# Patient Record
Sex: MALE
[De-identification: names, ages, dates, MRNs, and addresses within clinical notes are randomized per-mention and may not be internally consistent; named-entity substitution may affect disease eponyms.]

---

## 2018-04-07 ENCOUNTER — HOSPITAL ENCOUNTER (EMERGENCY)
Dept: HOSPITAL 42 - ED | Age: 83
LOS: 1 days | Discharge: HOME | End: 2018-04-08
Payer: MEDICARE

## 2018-04-07 VITALS — BODY MASS INDEX: 25.7 KG/M2

## 2018-04-07 VITALS — HEART RATE: 78 BPM | RESPIRATION RATE: 19 BRPM

## 2018-04-07 VITALS — OXYGEN SATURATION: 100 % | TEMPERATURE: 97.6 F

## 2018-04-07 VITALS — DIASTOLIC BLOOD PRESSURE: 97 MMHG | SYSTOLIC BLOOD PRESSURE: 149 MMHG

## 2018-04-07 DIAGNOSIS — L02.32: Primary | ICD-10-CM

## 2018-04-07 NOTE — ED PDOC
Arrival/HPI





- General


Historian: Patient





<Edmundo Mc - Last Filed: 04/07/18 22:58>





<Josué Balderas - Last Filed: 04/07/18 23:24>





- General


Chief Complaint: Abnormal Skin Integrity


Time Seen by Provider: 04/07/18 22:49





- History of Present Illness


Narrative History of Present Illness (Text): 





04/07/18 23:02


84 y/o male, pmh including htn/hyperlipidemia, nkda, c/o 2 pimple near the rt. 

buttock region x 3 days.  Pt. stated he has 2 small pimple on the rt. buttock 

gluteal region, tried to squeezed it and no oozing/discharge, no fever or chills

, no numbness or tingling, eating and drinking well, no change in energy or 

appetize, no other medical or psychological complaints.  (Edmundo Mc)





Past Medical History





- Provider Review


Nursing Documentation Reviewed: Yes





- Infectious Disease


Hx of Infectious Diseases: None





- Tetanus Immunization


Tetanus Immunization: Unknown





- Cardiac


Hx Hypertension: Yes


Hx Pacemaker: No





- Neurological


Hx Paralysis: No





- Hematological/Oncological


Hx Blood Transfusions: No





- Musculoskeletal/Rheumatological


Hx Musculoskeletal Disorders: Yes (NEUROPATHY)





- Psychiatric


Hx Emotional Abuse: No


Hx Physical Abuse: No


Hx Substance Use: No





- Surgical History


Hx Cardiac Catheterization: Yes





- Anesthesia


Hx Anesthesia: Yes


Hx Anesthesia Reactions: No


Hx Malignant Hyperthermia: No





- Suicidal Assessment


Feels Threatened In Home Enviroment: No





<Edmundo Mc - Last Filed: 04/07/18 22:58>





Family/Social History





- Physician Review


Nursing Documentation Reviewed: Yes


Family/Social History: Unknown Family HX


Smoking Status: Never Smoked


Hx Alcohol Use: Yes (WINE)


Frequency of alcohol use: Socially


Hx Substance Use: No


Hx Substance Use Treatment: No





<Edmundo Mc - Last Filed: 04/07/18 22:58>





Allergies/Home Meds





<Edmundo Mc - Last Filed: 04/07/18 22:58>





<Josué Balderas - Last Filed: 04/07/18 23:24>


Allergies/Adverse Reactions: 


Allergies





No Known Allergies Allergy (Verified 04/07/18 22:55)


 








Home Medications: 


 Home Meds











 Medication  Instructions  Recorded  Confirmed


 


Gabapentin [Gabapentin] 300 mg PO BID 01/16/15 08/04/15


 


Metoprolol Succinate [Toprol XL] 25 mg PO TID 01/16/15 08/04/15


 


Simvastatin [Zocor] 20 mg PO DAILY 01/16/15 08/04/15


 


Ascorbic Acid [Vitamin C] 500 mg PO DAILY 02/27/15 08/04/15


 


Calcium Carbonate [Calcium] 600 mg PO DAILY 02/27/15 08/04/15


 


Cholecalciferol (Vitamin D3) 1,000 iu PO DAILY 02/27/15 08/04/15





[Vitamin D]   


 


Fish Oil 1 iu PO DAILY 02/27/15 08/04/15


 


Ibuprofen 200 mg PO BID PRN 02/27/15 07/24/15


 


Magnesium 200 mg PO DAILY 02/27/15 08/04/15


 


Vitamin E 100 iu PO DAILY 02/27/15 08/04/15


 


Zinc 50 mg PO DAILY 02/27/15 08/04/15


 


Acetaminophen/Codeine Phosph 1 tab PO Q4H PRN 08/04/15 04/07/18





[Acetaminophen and Codeine   





Phosphate #3 300 mg]   














Review of Systems





- Review of Systems


Constitutional: absent: Fatigue, Fevers


Eyes: absent: Vision Changes


ENT: absent: Hearing Changes


Respiratory: absent: SOB, Cough


Cardiovascular: absent: Chest Pain


Gastrointestinal: absent: Abdominal Pain, Nausea, Vomiting


Skin: Rash, Skin Lesions.  absent: Pruritis, Laceration, Abscess, Ulcer


Neurological: absent: Headache, Dizziness


Psychiatric: absent: Anxiety, Depression





<Edmundo Mc Q - Last Filed: 04/07/18 22:58>





Physical Exam


Vital Signs Reviewed: Yes


Temperature: Afebrile


Blood Pressure: Hypertensive


Pulse: Regular


Respiratory Rate: Normal


Appearance: Positive for: Well-Appearing, Non-Toxic, Comfortable


Pain Distress: Moderate


Mental Status: Positive for: Alert and Oriented X 3





- Systems Exam


Head: Present: Atraumatic, Normocephalic


Pupils: Present: PERRL


Extroacular Muscles: Present: EOMI


Conjunctiva: Present: Normal


Mouth: Present: Moist Mucous Membranes


Neck: Present: Normal Range of Motion


Respiratory/Chest: Present: Clear to Auscultation, Good Air Exchange.  No: 

Respiratory Distress, Accessory Muscle Use


Cardiovascular: Present: Regular Rate and Rhythm, Normal S1, S2.  No: Murmurs


Abdomen: No: Tenderness, Distention, Peritoneal Signs


Back: Present: Normal Inspection


Upper Extremity: Present: Normal Inspection.  No: Cyanosis, Edema


Lower Extremity: Present: Normal Inspection.  No: Edema


Neurological: Present: GCS=15, Speech Normal, Motor Func Grossly Intact, Gait 

Normal, Memory Normal


Skin: Present: Warm, Dry, Rashes (rt. gluteal muscle region visible 2 furuncles 

approx. 1cm with surrounding erythematous approx. 2cm diameter noted, no 

abscess fluctuant appreciated. ), Normal Color


Psychiatric: Present: Alert, Oriented x 3, Normal Insight, Normal Concentration





<Edmundo Mc - Last Filed: 04/07/18 22:58>





Vital Signs











  Temp Pulse Resp BP Pulse Ox


 


 04/07/18 23:06   87   191/116 H 


 


 04/07/18 22:54  97.6 F  72  20  192/116 H  100














Medical Decision Making





<Edmundo Mc - Last Filed: 04/07/18 22:58>





<Josué Balderas - Last Filed: 04/07/18 23:24>


ED Course and Treatment: 





04/07/18 23:04


-keflex/bactrim/tramadol and clonidine.














04/07/18 23:05


-Discharge home with keflex, bactrim ds, tylenol, wash the wound twice daily, 

follow up with your own pmd and general surgeon within 2 days, return to the ER 

for any new or worsening signs or symptoms.  (Edmundo Mc)





- Medication Orders


Current Medication Orders: 














Discontinued Medications





Cephalexin Monohydrate (Keflex)  500 mg PO STAT STA


   PRN Reason: Protocol


   Stop: 04/07/18 22:59


   Last Admin: 04/07/18 23:05  Dose: 500 mg





Clonidine HCl (Catapres)  0.1 mg PO STAT STA


   Stop: 04/07/18 23:00


   Last Admin: 04/07/18 23:06  Dose: 0.1 mg





MAR Pulse and Blood Pressure


 Document     04/07/18 23:06  JMMARIFER  (Rec: 04/07/18 23:06  [facility]IVIDAD  [id number])


     Pulse


      Pulse Rate (60-90 beats/min)               87


     Blood Pressure


      Blood Pressure (100//90 mm Hg)       191/116





Tramadol/Acetaminophen (Ultracet 37.5/325 Mg)  1 tab PO STAT STA


   Stop: 04/07/18 23:00


   Last Admin: 04/07/18 23:06  Dose: 1 tab





MAR Pain Assessment


 Document     04/07/18 23:06  NATIVIDAD  (Rec: 04/07/18 23:07  [facility]  [id number])


     Pain Reassessment


      Is this a pain reassessment?               No


     Sleep


      Is patient sleeping during reassessment?   No


     Presence of Pain


      Presence of Pain                           Yes


     Pain Scale Used


      Pain Scale Used                            Numeric


     Location


      Left, Right or Bilateral                   Right


      Upper or Lower                             Upper


     Description


      Description                                Acute


      Intensity of Pain at present               5


      Acceptable Level of Pain                   0


      Pain Behavior                              Facial Grimacing


      Aggravating Factors                        Sitting





Trimethoprim/Sulfamethoxazole (Bactrim Ds Tab)  1 tab PO STAT STA


   PRN Reason: Protocol


   Stop: 04/07/18 22:59


   Last Admin: 04/07/18 23:06  Dose: 1 tab











- PA / NP / Resident Statement


WILBER has reviewed & agrees with the documentation as recorded.





<Edmundo Mc - Last Filed: 04/07/18 22:58>





- PA / NP / Resident Statement


WILBER has reviewed & agrees with the documentation as recorded.


WILBER has examined the patient and agrees with the treatment plan.





<Josué Balderas - Last Filed: 04/07/18 23:24>





Disposition/Present on Arrival





- Present on Arrival


Any Indicators Present on Arrival: No


History of DVT/PE: No


History of Uncontrolled Diabetes: No


Urinary Catheter: No


History of Decub. Ulcer: No


History Surgical Site Infection Following: None





- Disposition


Have Diagnosis and Disposition been Completed?: Yes


Disposition Time: 23:07


Patient Plan: Discharge





<Edmundo Mc - Last Filed: 04/07/18 22:58>





<Josué Balderas - Last Filed: 04/07/18 23:24>





- Disposition


Diagnosis: 


 Furuncle of buttock





Disposition: HOME/ ROUTINE


Condition: GOOD


Additional Instructions: 


-Discharge home with keflex, bactrim ds, tylenol, wash the wound twice daily, 

follow up with your own pmd and general surgeon within 2 days, return to the ER 

for any new or worsening signs or symptoms. 


Prescriptions: 


Acetaminophen [Tylenol 325mg tab] 2 tab PO QID PRN #30 tab


 PRN Reason: Other


Cephalexin [Keflex] 500 mg PO QID #40 capsule


Sulfamethoxazole/Trimethoprim [Bactrim  mg-160 mg] 1 tab PO BID #20 tab


Referrals: 


Gustavo Lozoya MD [Staff Provider] - Follow up with primary


Forms:  WORK NOTE